# Patient Record
Sex: MALE | Race: WHITE | Employment: OTHER | URBAN - METROPOLITAN AREA
[De-identification: names, ages, dates, MRNs, and addresses within clinical notes are randomized per-mention and may not be internally consistent; named-entity substitution may affect disease eponyms.]

---

## 2022-08-28 ENCOUNTER — HOSPITAL ENCOUNTER (EMERGENCY)
Age: 62
Discharge: HOME | End: 2022-08-28
Attending: EMERGENCY MEDICINE
Payer: COMMERCIAL

## 2022-08-28 ENCOUNTER — APPOINTMENT (OUTPATIENT)
Dept: GENERAL RADIOLOGY | Age: 62
End: 2022-08-28
Payer: COMMERCIAL

## 2022-08-28 VITALS
TEMPERATURE: 97.8 F | RESPIRATION RATE: 20 BRPM | OXYGEN SATURATION: 97 % | DIASTOLIC BLOOD PRESSURE: 64 MMHG | HEIGHT: 72 IN | HEART RATE: 67 BPM | SYSTOLIC BLOOD PRESSURE: 104 MMHG

## 2022-08-28 DIAGNOSIS — R07.89 ATYPICAL CHEST PAIN: Primary | ICD-10-CM

## 2022-08-28 LAB — TROPONIN, HIGH SENSITIVITY: 30 NG/L (ref 0–22)

## 2022-08-28 PROCEDURE — 99284 EMERGENCY DEPT VISIT MOD MDM: CPT

## 2022-08-28 PROCEDURE — 84484 ASSAY OF TROPONIN QUANT: CPT

## 2022-08-28 PROCEDURE — 93005 ELECTROCARDIOGRAM TRACING: CPT | Performed by: EMERGENCY MEDICINE

## 2022-08-28 PROCEDURE — 71045 X-RAY EXAM CHEST 1 VIEW: CPT

## 2022-08-28 RX ORDER — DEXAMETHASONE 6 MG/1
6 TABLET ORAL
COMMUNITY

## 2022-08-28 RX ORDER — ONDANSETRON 4 MG/1
4 TABLET, FILM COATED ORAL EVERY 8 HOURS PRN
COMMUNITY

## 2022-08-28 RX ORDER — ASCORBIC ACID 500 MG
500 TABLET ORAL DAILY
COMMUNITY

## 2022-08-28 RX ORDER — ACETAMINOPHEN 325 MG/1
650 TABLET ORAL EVERY 6 HOURS PRN
COMMUNITY

## 2022-08-28 RX ORDER — GUAIFENESIN 600 MG/1
1200 TABLET, EXTENDED RELEASE ORAL NIGHTLY
COMMUNITY

## 2022-08-28 RX ORDER — ZINC SULFATE 50(220)MG
50 CAPSULE ORAL DAILY
COMMUNITY

## 2022-08-28 RX ORDER — OMEPRAZOLE 20 MG/1
20 CAPSULE, DELAYED RELEASE ORAL DAILY
COMMUNITY

## 2022-08-28 NOTE — ED PROVIDER NOTES
EMERGENCY DEPARTMENT ENCOUNTER    Pt Name: Fareed Dick  MRN: [id number]  [place] 1960  Date of evaluation: 8/28/22  CHIEF COMPLAINT       Chief Complaint   Patient presents with    Chest Pain    Positive For Covid-19     HISTORY OF PRESENT ILLNESS   Patient is a 77-year-old male who was brought in by EMS for chest pain. Pain started earlier this evening. Pain centrally located. Pain described as intermittent, burning, nonradiating, nonpositional.  He thought he had acid reflux. Staff was concerned and called EMS. EMS administered nitro and aspirin and symptoms resolved. No diaphoresis, no vomiting. No shortness of breath, cough. He was diagnosed with COVID-19 last week. REVIEW OF SYSTEMS     Review of Systems   All other systems reviewed and are negative. PASTMEDICAL HISTORY   History reviewed. No pertinent past medical history. SURGICAL HISTORY     History reviewed. No pertinent surgical history. CURRENT MEDICATIONS       Previous Medications    ACETAMINOPHEN (TYLENOL) 325 MG TABLET    Take 650 mg by mouth every 6 hours as needed for Pain    ASCORBIC ACID (VITAMIN C) 500 MG TABLET    Take 500 mg by mouth daily    DEXAMETHASONE (DECADRON) 6 MG TABLET    Take 6 mg by mouth daily (with breakfast)    GUAIFENESIN (MUCINEX) 600 MG EXTENDED RELEASE TABLET    Take 1,200 mg by mouth nightly    OMEPRAZOLE (PRILOSEC) 20 MG DELAYED RELEASE CAPSULE    Take 20 mg by mouth daily    ONDANSETRON (ZOFRAN) 4 MG TABLET    Take 4 mg by mouth every 8 hours as needed for Nausea or Vomiting    ZINC SULFATE (ZINCATE) 220 (50 ZN) MG CAPSULE    Take 50 mg by mouth daily     ALLERGIES     has No Known Allergies. FAMILY HISTORY     has no family status information on file.       SOCIAL HISTORY       Social History     Tobacco Use    Smoking status: Every Day     Types: Cigarettes   Substance Use Topics    Alcohol use: Not Currently    Drug use: Not Currently     PHYSICAL EXAM     INITIAL VITALS: BP 94/65   Pulse 63 Temp 97.8 °F (36.6 °C) (Oral)   Resp 20   Ht 6' (1.829 m)   SpO2 96%    Physical Exam  HENT:      Head: Normocephalic. Right Ear: External ear normal.      Left Ear: External ear normal.      Nose: Nose normal.   Eyes:      Conjunctiva/sclera: Conjunctivae normal.   Cardiovascular:      Rate and Rhythm: Normal rate and regular rhythm. Heart sounds: Normal heart sounds. Pulmonary:      Effort: Pulmonary effort is normal.      Breath sounds: Normal breath sounds. Abdominal:      General: Abdomen is flat. Skin:     General: Skin is dry. Neurological:      Mental Status: He is alert. Mental status is at baseline. Psychiatric:         Mood and Affect: Mood normal.         Behavior: Behavior normal.       MEDICAL DECISION MAKING:   The patient is hemodynamically stable, afebrile, nontoxic-appearing. Physical exam unremarkable. Based on history and exam low suspicion for ACS, most likely acid reflux. ED plan for troponin, chest x-ray, EKG, reassess. DIAGNOSTIC RESULTS   EKG:All EKG's are interpreted by the Emergency Department Physician who either signs or Co-signs this chart in the absence of a cardiologist.        RADIOLOGY:All plain film, CT, MRI, and formal ultrasound images (except ED bedside ultrasound) are read by the radiologist, see reports below, unless otherwisenoted in MDM or here. XR CHEST PORTABLE   Final Result   Bronchial thickening in the lower lungs and general increased interstitial   markings. Features may represent COPD with bronchitis/bronchiolitis. LABS: All lab results were reviewed by myself, and all abnormals are listed below. Labs Reviewed   TROPONIN - Abnormal; Notable for the following components:       Result Value    Troponin, High Sensitivity 30 (*)     All other components within normal limits       EMERGENCY DEPARTMENTCOURSE:   Patient did well in the ED. EKG nonischemic. Troponin negative. Chest x-ray negative for infiltrate.   Does show evidence of bronchitis. No further work-up indicated at this time. Nursing notes reviewed. At this time this is what I find, the patient appears well and does not appear sick or toxic. I gave my usual and customary discussion of the risks and benefits of discharge versus admission. I answered the patient's questions. I gave the patient strict return precautions. Patient expressed understanding of the discharge instructions. The care is provided during an unprecedented national emergency due to the novel coronavirus, COVID-19. Vitals:    Vitals:    08/28/22 0208 08/28/22 0213 08/28/22 0237 08/28/22 0300   BP:  97/65 95/61 94/65   Pulse:   66 63   Resp:   18 20   Temp: 97.8 °F (36.6 °C)      TempSrc: Oral      SpO2:   94% 96%   Height:           The patient was given the following medications while in the emergency department:  No orders of the defined types were placed in this encounter. CONSULTS:  None    FINAL IMPRESSION      1.  Atypical chest pain          DISPOSITION/PLAN   DISPOSITION Decision To Discharge 08/28/2022 03:13:33 AM      PATIENT REFERRED TO:  DO Ky Gordillo[address] [phone]        DISCHARGE MEDICATIONS:  New Prescriptions    No medications on file     Jose Daniels MD  Attending Emergency Physician                    Chanel Milton MD  08/28/22 8290

## 2022-08-29 LAB
EKG ATRIAL RATE: 89 BPM
EKG P AXIS: 72 DEGREES
EKG P-R INTERVAL: 208 MS
EKG Q-T INTERVAL: 432 MS
EKG QRS DURATION: 140 MS
EKG QTC CALCULATION (BAZETT): 525 MS
EKG R AXIS: -68 DEGREES
EKG T AXIS: 96 DEGREES
EKG VENTRICULAR RATE: 89 BPM

## 2022-08-29 PROCEDURE — 93010 ELECTROCARDIOGRAM REPORT: CPT | Performed by: INTERNAL MEDICINE
